# Patient Record
Sex: MALE | URBAN - METROPOLITAN AREA
[De-identification: names, ages, dates, MRNs, and addresses within clinical notes are randomized per-mention and may not be internally consistent; named-entity substitution may affect disease eponyms.]

---

## 2024-07-31 ENCOUNTER — TRANSCRIBE ORDERS (OUTPATIENT)
Dept: ONCOLOGY | Facility: CLINIC | Age: 64
End: 2024-07-31
Payer: MEDICARE

## 2024-07-31 ENCOUNTER — PATIENT OUTREACH (OUTPATIENT)
Dept: ONCOLOGY | Facility: CLINIC | Age: 64
End: 2024-07-31
Payer: MEDICARE

## 2024-07-31 DIAGNOSIS — C71.9 GLIOBLASTOMA (H): Primary | ICD-10-CM

## 2024-07-31 NOTE — PROGRESS NOTES
"New Patient Hematology / Oncology Nurse Navigator Note     Referral Date: 7/31/24    Referring provider: Per Email correspondence to Dr. Pringle from: Marta Berry/Hermelindo Gavin/Mary Ellen Gordon     Referring Clinic/Organization: Self Referred     Referred to: Neuro Oncology    Requested provider (if applicable): Dr. Pringle    Evaluation for : Glioblastoma/clinical trial options. Pt interested in CAR T (aware Dr. Pringle is not offering this)and discussing any potential clinical trial options while here in [place]      Clinical History (per Nurse review of records provided):    Per report from significant other Wendy:  \"The series of events went like this:  1.      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 It was once a week 750 ML for 4 weeks?  Scan afterward showed insignificant amount present, and we were to go back every 6 months for a scan all good.  2.      April 2nd Mason has a seizure in [place], and I take him to emergency room at [facility] where they ran a series of tests on him and determine he has a mass on his brain. He is advised to go back to [place] and see his oncologist. They were thinking most likely it had to do with the lymphoma.  3.      Back in [place] a biopsy is done, and they determine it looks different than what they thought it should look like if it was linked to lymphoma, so they refer him to a specialist at [facility] and send off the biopsy. 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  4.      Surgery is scheduled to remove the tumor and Mason recovers from that great.  5.      Radiation and chemo starts. He has now finished 30 rounds of radiation (daily M-F for 6 weeks). (see below for the amount of oral chemo EVERY day for 6 weeks).   6.      A scan is scheduled at [facility] for August 13th  and afterward he is to start back up on the chemo,  but the dose will be doubled from what he was taking with radiation. (see " "below)\"    Clinical Assessment / Barriers to Care (Per Nurse):  Pt lives in [place], will be in [place] 8/15-9/5    Records Location: No records received     Records Needed:   Imaging from [facility] ( MRI done at [facility]), images/path from [facility], notes from Oncology/labs, Radiation Summary from [facility], path report from his lymphoma dx as well please.    Urgent IB to intake team to help obtain records as I am unable to locate via CE.      Additional testing needed prior to consult:   N/A    Referral updates and Plan:   INCOMING CALL from pt's significant other Wendy.   Introduced my role as nurse navigator with [facility] Hematology/Oncology dept and that we have recd the self-referral to Dr. Pringle.     Wendy confirms she is aware of the referral and has discussed the the team directly. Per Wendy, pt is currently in [place] but will be back in [place] 8/15.    Provided contact information if future questions arise.   Transferred to NPS line [phone] to schedule appt per scheduling instructions. Wendy is aware we do not have records at this time and will be reaching out to patient as needed to obtain all records. He will be back from [place] tomorrow.     Verena Cornejo, BSN, RN, PHN, OCN  Hematology/Oncology Nurse Navigator  [facility]  [phone]    "

## 2024-07-31 NOTE — TELEPHONE ENCOUNTER
Action 2024 12:04 PM ABT   Action Taken Called and spoke to patient, he confirmed  is 1960. PT did state that he received RT in [place]. After confirming  - searched and updated CE under Counts include 234 beds at the [facility].    Called [facility] [phone] transferred to [facility] and spoke to Becca and was instructed to fax request to [phone]. Called [facility] [phone] option 2, unable to speak to team to get fax number, [facility] for fax number and shipping address.     Action 2024 4:05 PM ABT   Action Taken Called [facility] [phone] to obtain CE ID, was given ID: [id number] .    PT has a different birth Year on their file. Called and unable to speak to Wendy, left  for Wendy to confirm pt's  since [place].     RECORDS STATUS - ALL OTHER DIAGNOSIS      RECORDS RECEIVED FROM: [facility]   NOTES STATUS DETAILS   OFFICE NOTE from referring provider SELF    OFFICE NOTE from radiation oncologist [facility] End of Treatment  24: Dr. Bubba Anguiano   OFFICE NOTE from other specialist [facility]:  24: Dr. Shivam Yadav    [facility]:  24: Dr. Rigoberto Barber  24: Dr. Nikolay Hartley   DISCHARGE SUMMARY from hospital [facility] 24: [facility]    24: [facility]   DISCHARGE REPORT from the ER [facility] 24: [facility]   OPERATIVE REPORT [facility] 24: CRANIOTOMY FOR TUMOR RESECTION     24: Left Frontal Stereotactic Brain Biopsy with [facility] pre-op MRI at 1245    MEDICATION LIST [facility]    LABS     PATHOLOGY REPORTS Req  [facility]:  24: [id number]  24: [id number] (consult pf [id number])    [facility]:  24: [id number]   ANYTHING RELATED TO DIAGNOSIS [facility] Most recent 24   PATHOLOGY [employer] TRACKING   [facility] Tracking #: [id number]  [facility] Tracking #: [id number]   GENONOMIC TESTING     TYPE:      IMAGING (NEED IMAGES & REPORT)     CT SCANS  [facility]:  04/12/24: CT Head    [facility]:  04/02/24: CT Head Brain   MRI Req 07/31-[facility]:  05/17/24, 04/11/24: MR Brain    [facility]:  05/02/24, 04/30/24: MR Brain  04/30/24: MR 3D Brain    [facility]:   04/02/24: MR Brain  04/02/24: MRA Neck   PET  [facility]:  01/02/24, 10/24/23: PET CT Skull   IMAGE DISC FEDEX TRACKING   [facility] Tracking #: [id number]  [facility] Tracking #:

## 2024-07-31 NOTE — PROGRESS NOTES
-5/1/24 Op Note: Craniotomy--BOOKMARKED    5/1/24 Path:  FINAL PATHOLOGIC DIAGNOSIS   A. Brain, left brain tumor, resection:  Malignant glioma; see comment.   B. Brain, left brain tumor, CUSA:  Malignant glioma; see comment.   COMMENT:    Sections show a highly cellular, diffusely infiltrating glial neoplasm   comprised of pleomorphic tumor cells with enlarged and hyperchromatic   nuclei, nuclear contour irregularities and variable cytoplasm. There are   foci with oligodendroglia-like cytomorphology and architecture. Mitotic   figures are abundant (up to 15 mitotic figures per 2 mm2). Microvascular   proliferation and necrosis are present.   Immunohistochemical stains performed at [facility] on block A1   show the following:   - IDH1 p.R132H:      Negative in tumor cells.   - ATRX:               Retained expression.   - p53:               Strong nuclear staining in approximately 8% of   tumor cells.    - Ki-67:               Labeling index estimated at 6-8%.   Overall, the findings are consistent with a malignant glioma. The   differential includes glioblastoma, IDH-wildtype, CNS WHO grade 4   (favored) versus oligodendroglioma, IDH-mutant, 2k12w-oosmophvd, CNS WHO   grade 3 with a non-canonical IDH1/2 mutation. This tumor will be   evaluated by the [id number] NGS panel for further characterization and a   separate report from the [facility] will   follow, as well as an addendum if indicated. MGMT methylation analysis   will be performed at the [facility] and a   separate report will follow.       4/18/24 Path (brain biopsy)--BOOKMARKED    5/2/24 Post-op Brain MRI--BOOKMARKED    5/14/24 Visit with Neurosurgery--BOOKMARKED    Additional records still pending

## 2024-08-05 NOTE — TELEPHONE ENCOUNTER
Action August 5, 2024 10:21 AM AW   Action Taken 13 slides from [facility] received and taken to 5th floor path lab for review.    05/01/24: [id number]      Action August 6, 2024 10:42 AM AW   Action Taken 8 slides from [facility] received and taken to 5th floor path lab for review.    04/11/24: [id number]

## 2024-08-06 ENCOUNTER — LAB REQUISITION (OUTPATIENT)
Dept: LAB | Facility: CLINIC | Age: 64
End: 2024-08-06
Payer: MEDICARE

## 2024-08-06 LAB
PATH REPORT.COMMENTS IMP SPEC: ABNORMAL
PATH REPORT.COMMENTS IMP SPEC: YES
PATH REPORT.FINAL DX SPEC: ABNORMAL
PATH REPORT.GROSS SPEC: ABNORMAL
PATH REPORT.MICROSCOPIC SPEC OTHER STN: ABNORMAL
PATH REPORT.RELEVANT HX SPEC: ABNORMAL
PATH REPORT.RELEVANT HX SPEC: ABNORMAL
PATH REPORT.SITE OF ORIGIN SPEC: ABNORMAL

## 2024-08-06 PROCEDURE — 88321 CONSLTJ&REPRT SLD PREP ELSWR: CPT | Performed by: SPECIALIST

## 2024-08-07 ENCOUNTER — LAB REQUISITION (OUTPATIENT)
Dept: LAB | Facility: CLINIC | Age: 64
End: 2024-08-07
Payer: MEDICARE

## 2024-08-07 PROCEDURE — 88360 TUMOR IMMUNOHISTOCHEM/MANUAL: CPT | Mod: 26 | Performed by: SPECIALIST

## 2024-08-07 PROCEDURE — 88323 CONSLTJ&REPRT MATRL PREP SLD: CPT | Mod: 26 | Performed by: SPECIALIST

## 2024-08-07 PROCEDURE — 88341 IMHCHEM/IMCYTCHM EA ADD ANTB: CPT | Mod: TC | Performed by: PSYCHIATRY & NEUROLOGY

## 2024-08-07 PROCEDURE — 88341 IMHCHEM/IMCYTCHM EA ADD ANTB: CPT | Mod: 26 | Performed by: SPECIALIST

## 2024-08-07 PROCEDURE — 88342 IMHCHEM/IMCYTCHM 1ST ANTB: CPT | Mod: 26 | Performed by: SPECIALIST

## 2024-08-09 LAB
INTERPRETATION: NORMAL
LAB DIRECTOR COMMENTS: NORMAL
LAB DIRECTOR COMMENTS: NORMAL
LAB DIRECTOR DISCLAIMER: NORMAL
LAB DIRECTOR DISCLAIMER: NORMAL
LAB DIRECTOR INTERPRETATION: NORMAL
LAB DIRECTOR INTERPRETATION: NORMAL
LAB DIRECTOR METHODOLOGY: NORMAL
LAB DIRECTOR METHODOLOGY: NORMAL
LAB DIRECTOR RESULTS: NORMAL
LAB DIRECTOR RESULTS: NORMAL
SIGNIFICANT RESULTS: NORMAL
SPECIMEN DESCRIPTION: NORMAL
TEST DETAILS, MDL: NORMAL

## 2024-08-09 PROCEDURE — 81455 SO/HL 51/>GSAP DNA/DNA&RNA: CPT | Performed by: PSYCHIATRY & NEUROLOGY

## 2024-08-12 PROCEDURE — G0452 MOLECULAR PATHOLOGY INTERPR: HCPCS | Mod: 26 | Performed by: PATHOLOGY

## 2024-08-12 PROCEDURE — 81287 MGMT GENE PRMTR MTHYLTN ALYS: CPT | Performed by: PSYCHIATRY & NEUROLOGY

## 2024-08-15 NOTE — PROGRESS NOTES
"Received VM from Wendy Zimmerman (significant other) that Rigoberto is, \"going in a different direction\" and would like to cancel his upcoming appointment with Dr. Pringle. She will call back if/when he is interested in meeting with Dr. Pringle.     Attempted to reach Wendy. Left VM notifying her that message was received and appointment has been cancelled.     Will follow-up as needed pending further update from pt/Wendy.     Verena Cornejo, BSN, RN, PHN, OCN  Hematology/Oncology Nurse Navigator  [facility]  [phone]    "

## 2024-08-20 ENCOUNTER — PRE VISIT (OUTPATIENT)
Dept: ONCOLOGY | Facility: CLINIC | Age: 64
End: 2024-08-20
Payer: MEDICARE